# Patient Record
(demographics unavailable — no encounter records)

---

## 2024-11-14 NOTE — HISTORY OF PRESENT ILLNESS
[N/A] : N/A [Denies] : Denies [No] : No [Yes] : Yes [Declined] : Declined [Right upper extremity] : Right upper extremity [22g] : 22g [Start Time: ___] : Medication Start Time: [unfilled] [End Time: ___] : Medication End Time: [unfilled] [Medication Name: ___] : Medication Name: [unfilled] [Total Amount Administered: ___] : Total Amount Administered: [unfilled] [IV discontinued. Intact. No signs or symptoms of IV complications noted. Time: ___] : IV discontinued. Intact. No signs or symptoms of IV complications noted. Time: [unfilled] [Patient  instructed to seek medical attention with signs and symptoms of adverse effects] : Patient  instructed to seek medical attention with signs and symptoms of adverse effects [Patient left unit in no acute distress] : Patient left unit in no acute distress [Medications administered as ordered and tolerated well.] : Medications administered as ordered and tolerated well. [de-identified] : Right arm median cubital vein  [de-identified] : no labs [de-identified] : Patient presents for scheduled Entyvio infusion, ambulatory in [facility]. Patient reports 3 BM's daily, formed stool without blood or mucous present. Reports intermittent urgency but it is improving. Patient denies any abdominal pain, cramping or bloating Otherwise, no other symptoms or concerns verbalized. Patient premedicated as prescribed and infusion tolerated well.

## 2025-01-16 NOTE — HISTORY OF PRESENT ILLNESS
[N/A] : N/A [Denies] : Denies [No] : No [Yes] : Yes [Declined] : Declined [Right upper extremity] : Right upper extremity [22g] : 22g [Start Time: ___] : Medication Start Time: [unfilled] [End Time: ___] : Medication End Time: [unfilled] [Medication Name: ___] : Medication Name: [unfilled] [Total Amount Administered: ___] : Total Amount Administered: [unfilled] [IV discontinued. Intact. No signs or symptoms of IV complications noted. Time: ___] : IV discontinued. Intact. No signs or symptoms of IV complications noted. Time: [unfilled] [Patient  instructed to seek medical attention with signs and symptoms of adverse effects] : Patient  instructed to seek medical attention with signs and symptoms of adverse effects [Patient left unit in no acute distress] : Patient left unit in no acute distress [Medications administered as ordered and tolerated well.] : Medications administered as ordered and tolerated well. [de-identified] : Right arm median cubital vein  [de-identified] : no labs [de-identified] : Patient presents for scheduled Entyvio infusion, ambulatory in [facility]. Patient reports 3-4 BM's daily, soft to formed stool without any blood or mucous present. Reports intermittent urgency primarily after lunch. Patient denies any abdominal pain, cramping, bloating, nausea/vomiting. No other symptoms or concerns verbalized. Patient pre-medicated as per order and infusion tolerated well.

## 2025-01-23 NOTE — ASSESSMENT
[FreeTextEntry1] : 65 y.o M hx of CD doing well on Entyvio   1. continue entyvio  2, will get updated bloodwork including TB  3. follow up in  6 months

## 2025-03-06 NOTE — HISTORY OF PRESENT ILLNESS
[N/A] : N/A [Denies] : Denies [No] : No [Yes] : Yes [Declined] : Declined [Right upper extremity] : Right upper extremity [22g] : 22g [Total Amount Administered: ___] : Total Amount Administered: [unfilled] [Patient  instructed to seek medical attention with signs and symptoms of adverse effects] : Patient  instructed to seek medical attention with signs and symptoms of adverse effects [Patient left unit in no acute distress] : Patient left unit in no acute distress [Medications administered as ordered and tolerated well.] : Medications administered as ordered and tolerated well. [Medication Name: ___] : Medication Name: [unfilled] [Start Time: ___] : Medication Start Time: [unfilled] [End Time: ___] : Medication End Time: [unfilled] [IV discontinued. Intact. No signs or symptoms of IV complications noted. Time: ___] : IV discontinued. Intact. No signs or symptoms of IV complications noted. Time: [unfilled] [de-identified] : right arm median cubital vein  [de-identified] : no labs [de-identified] : Patient presents for scheduled Entyvio infusion, ambulatory in [facility]. Patient denies any recent infections, ABX use or hospitalizations. Patient continues to report 3-4 BM's daily, soft to formed stool without any blood or mucous present. Reports continued intermittent urgency, but denies any abdominal pain, cramping, bloating, nausea/vomiting. No other symptoms or concerns verbalized. Patient pre-medicated as per order and infusion tolerated well. Patient ambulated off the unit in [facility] upon medication completion.

## 2025-06-18 NOTE — HISTORY OF PRESENT ILLNESS
[N/A] : N/A [Denies] : Denies [No] : No [Yes] : Yes [Declined] : Declined [Right upper extremity] : Right upper extremity [22g] : 22g [Start Time: ___] : Medication Start Time: [unfilled] [End Time: ___] : Medication End Time: [unfilled] [Medication Name: ___] : Medication Name: [unfilled] [Total Amount Administered: ___] : Total Amount Administered: [unfilled] [IV discontinued. Intact. No signs or symptoms of IV complications noted. Time: ___] : IV discontinued. Intact. No signs or symptoms of IV complications noted. Time: [unfilled] [Patient  instructed to seek medical attention with signs and symptoms of adverse effects] : Patient  instructed to seek medical attention with signs and symptoms of adverse effects [Patient left unit in no acute distress] : Patient left unit in no acute distress [Medications administered as ordered and tolerated well.] : Medications administered as ordered and tolerated well. [Blood drawn at time of visit] : Blood drawn at time of visit [de-identified] : Right arm median cubital vein  [de-identified] : Patient presents for scheduled Entyvio infusion, ambulatory in [facility]. Patient reports to have missed his last dose because of insurance issues. Patient reports to be flaring up a little - 6-7 BM's daily, loose stool without blood or mucous present. Reports intermittent urgency but it is improving. Patient reports to have abdominal pain, but no cramping or bloating. No other symptoms or concerns verbalized. Patient premedicated as prescribed and infusion tolerated well.

## 2025-07-15 NOTE — PHYSICAL EXAM
[Alert] : alert [Normal Voice/Communication] : normal voice/communication [Healthy Appearing] : healthy appearing [No Acute Distress] : no acute distress [No Respiratory Distress] : no respiratory distress [No Acc Muscle Use] : no accessory muscle use [Bowel Sounds] : normal bowel sounds [Abdomen Tenderness] : non-tender [No Masses] : no abdominal mass palpated [Abdomen Soft] : soft [] : no hepatosplenomegaly [Oriented To Time, Place, And Person] : oriented to person, place, and time

## 2025-07-15 NOTE — ASSESSMENT
[FreeTextEntry1] : 64 yo M with ileal Crohn's disease doing well on Entyvio  #Crohn's disease - Continue Entyvio infusion - Follow up in 6 months - Vit D supplementation

## 2025-07-15 NOTE — HISTORY OF PRESENT ILLNESS
[FreeTextEntry1] : 64 yo M with PMH of ileal Crohn's  He continues to do well on Entyvio maintenance every 8 weeks He missed his dose in May and was delayed one month for infusion due to insurance issues- he had several days of nonbloody diarrhea 7-10x/day and abdominal pain for a few days which resolved after his infusion in June  Last colonoscopy 5/2024: normal colon, no active disease, unable to visualize TI  He denies any further diarrhea and has 3 nonbloody BMs per day. He denies any abdominal pain or n/v He denies any joint pain, eye issues, rashes, or mouth ulcers  Last labs 1/2025: vit D deficient Quant and hep B uptodate  Social history: retired from [employer], [employer]

## 2025-07-15 NOTE — END OF VISIT
[FreeTextEntry3] : 65-year-old male Crohn's ileitis continued good control with Entyvio infusions every 8 weeks Recent recurrence of complaints when he missed an infusion, profuse diarrhea, now controlled again Weight gain noted, overall feeling well Recently retired Plan: Continue current medications, office visit again in 6 months